# Patient Record
Sex: MALE | Race: WHITE
[De-identification: names, ages, dates, MRNs, and addresses within clinical notes are randomized per-mention and may not be internally consistent; named-entity substitution may affect disease eponyms.]

---

## 2018-12-13 ENCOUNTER — HOSPITAL ENCOUNTER (INPATIENT)
Dept: HOSPITAL 92 - ERS | Age: 83
LOS: 5 days | Discharge: HOME HEALTH | DRG: 689 | End: 2018-12-18
Attending: FAMILY MEDICINE | Admitting: FAMILY MEDICINE
Payer: MEDICARE

## 2018-12-13 VITALS — BODY MASS INDEX: 22.4 KG/M2

## 2018-12-13 DIAGNOSIS — I48.0: ICD-10-CM

## 2018-12-13 DIAGNOSIS — Z79.01: ICD-10-CM

## 2018-12-13 DIAGNOSIS — M25.552: ICD-10-CM

## 2018-12-13 DIAGNOSIS — K21.9: ICD-10-CM

## 2018-12-13 DIAGNOSIS — N40.0: ICD-10-CM

## 2018-12-13 DIAGNOSIS — N18.2: ICD-10-CM

## 2018-12-13 DIAGNOSIS — Z87.891: ICD-10-CM

## 2018-12-13 DIAGNOSIS — D64.9: ICD-10-CM

## 2018-12-13 DIAGNOSIS — I12.9: ICD-10-CM

## 2018-12-13 DIAGNOSIS — M62.82: ICD-10-CM

## 2018-12-13 DIAGNOSIS — E53.8: ICD-10-CM

## 2018-12-13 DIAGNOSIS — Z85.118: ICD-10-CM

## 2018-12-13 DIAGNOSIS — N39.0: Primary | ICD-10-CM

## 2018-12-13 DIAGNOSIS — I24.8: ICD-10-CM

## 2018-12-13 DIAGNOSIS — G92: ICD-10-CM

## 2018-12-13 LAB — CK MB SERPL-MCNC: 9.4 NG/ML (ref 0–6.6)

## 2018-12-13 PROCEDURE — 87086 URINE CULTURE/COLONY COUNT: CPT

## 2018-12-13 PROCEDURE — 36415 COLL VENOUS BLD VENIPUNCTURE: CPT

## 2018-12-13 PROCEDURE — 80048 BASIC METABOLIC PNL TOTAL CA: CPT

## 2018-12-13 PROCEDURE — 85025 COMPLETE CBC W/AUTO DIFF WBC: CPT

## 2018-12-13 PROCEDURE — 82553 CREATINE MB FRACTION: CPT

## 2018-12-13 PROCEDURE — 84443 ASSAY THYROID STIM HORMONE: CPT

## 2018-12-13 PROCEDURE — 82746 ASSAY OF FOLIC ACID SERUM: CPT

## 2018-12-13 PROCEDURE — 82607 VITAMIN B-12: CPT

## 2018-12-13 PROCEDURE — 83735 ASSAY OF MAGNESIUM: CPT

## 2018-12-13 PROCEDURE — 84100 ASSAY OF PHOSPHORUS: CPT

## 2018-12-13 PROCEDURE — 82140 ASSAY OF AMMONIA: CPT

## 2018-12-13 PROCEDURE — 80076 HEPATIC FUNCTION PANEL: CPT

## 2018-12-13 PROCEDURE — 82550 ASSAY OF CK (CPK): CPT

## 2018-12-13 PROCEDURE — 96374 THER/PROPH/DIAG INJ IV PUSH: CPT

## 2018-12-13 PROCEDURE — 71045 X-RAY EXAM CHEST 1 VIEW: CPT

## 2018-12-13 PROCEDURE — 84484 ASSAY OF TROPONIN QUANT: CPT

## 2018-12-13 PROCEDURE — 93005 ELECTROCARDIOGRAM TRACING: CPT

## 2018-12-13 PROCEDURE — 83690 ASSAY OF LIPASE: CPT

## 2018-12-13 RX ADMIN — CEFTRIAXONE SCH MLS: 2 INJECTION, POWDER, FOR SOLUTION INTRAMUSCULAR; INTRAVENOUS at 23:50

## 2018-12-13 NOTE — RAD
CHEST 1 VIEW PORTABLE:

 

Date:  12/13/18 

 

HISTORY:  

89-year-old male with altered mental status. 

 

FINDINGS:

There is fairly prominent rotation to the left. Elevated left hemidiaphragm appears stable. Old granu
lomatous disease. No new confluent pneumonia, overt edema, or significant pleural effusion. 

 

IMPRESSION: 

Prominent elevation of the left hemidiaphragm, stable. Old granulomatous disease with some scattered 
chronic lung changes. Atherosclerosis of aorta with ectasia. No significant acute process. 

 

 

POS: [facility]

## 2018-12-14 LAB
ALBUMIN SERPL BCG-MCNC: 3.4 G/DL (ref 3.4–4.8)
ALP SERPL-CCNC: 75 U/L (ref 40–150)
ALT SERPL W P-5'-P-CCNC: 11 U/L (ref 8–55)
ANION GAP SERPL CALC-SCNC: 12 MMOL/L (ref 10–20)
AST SERPL-CCNC: 38 U/L (ref 5–34)
BASOPHILS # BLD AUTO: 0 THOU/UL (ref 0–0.2)
BASOPHILS NFR BLD AUTO: 0.1 % (ref 0–1)
BILIRUB DIRECT SERPL-MCNC: 0.5 MG/DL (ref 0.1–0.3)
BILIRUB SERPL-MCNC: 0.8 MG/DL (ref 0.2–1.2)
BUN SERPL-MCNC: 23 MG/DL (ref 8.4–25.7)
CALCIUM SERPL-MCNC: 9.3 MG/DL (ref 7.8–10.44)
CHLORIDE SERPL-SCNC: 110 MMOL/L (ref 98–107)
CK SERPL-CCNC: 1376 U/L (ref 30–200)
CO2 SERPL-SCNC: 24 MMOL/L (ref 23–31)
CREAT CL PREDICTED SERPL C-G-VRATE: 51 ML/MIN (ref 70–130)
CREAT CL PREDICTED SERPL C-G-VRATE: 57 ML/MIN (ref 70–130)
EOSINOPHIL # BLD AUTO: 0 THOU/UL (ref 0–0.7)
EOSINOPHIL NFR BLD AUTO: 0.2 % (ref 0–10)
GLUCOSE SERPL-MCNC: 109 MG/DL (ref 83–110)
HGB BLD-MCNC: 12.7 G/DL (ref 14–18)
HGB BLD-MCNC: 12.7 G/DL (ref 14–18)
LIPASE SERPL-CCNC: 8 U/L (ref 8–78)
LYMPHOCYTES # BLD: 1.6 THOU/UL (ref 1.2–3.4)
LYMPHOCYTES NFR BLD AUTO: 19.6 % (ref 21–51)
MAGNESIUM SERPL-MCNC: 2.1 MG/DL (ref 1.6–2.6)
MCH RBC QN AUTO: 32.4 PG (ref 27–31)
MCV RBC AUTO: 97.4 FL (ref 78–98)
MONOCYTES # BLD AUTO: 0.7 THOU/UL (ref 0.11–0.59)
MONOCYTES NFR BLD AUTO: 8.4 % (ref 0–10)
NEUTROPHILS # BLD AUTO: 6 THOU/UL (ref 1.4–6.5)
NEUTROPHILS NFR BLD AUTO: 71.8 % (ref 42–75)
PLATELET # BLD AUTO: 134 THOU/UL (ref 130–400)
PLATELET # BLD AUTO: 138 THOU/UL (ref 130–400)
POTASSIUM SERPL-SCNC: 4.1 MMOL/L (ref 3.5–5.1)
RBC # BLD AUTO: 3.92 MILL/UL (ref 4.7–6.1)
SODIUM SERPL-SCNC: 142 MMOL/L (ref 136–145)
VIT B12 SERPL-MCNC: 284 PG/ML (ref 211–911)
WBC # BLD AUTO: 8.4 THOU/UL (ref 4.8–10.8)

## 2018-12-14 RX ADMIN — DOCUSATE SODIUM 50 MG AND SENNOSIDES 8.6 MG SCH TAB: 8.6; 5 TABLET, FILM COATED ORAL at 19:37

## 2018-12-14 RX ADMIN — Medication SCH: at 19:39

## 2018-12-14 RX ADMIN — Medication SCH: at 08:36

## 2018-12-14 RX ADMIN — CEFTRIAXONE SCH MLS: 2 INJECTION, POWDER, FOR SOLUTION INTRAMUSCULAR; INTRAVENOUS at 23:46

## 2018-12-14 NOTE — HP
PRIMARY CARE PHYSICIAN:  Dr. Jackson Ann.



CHIEF COMPLAINT:  Agitation, altered mental status, and UTI.



HISTORY OF PRESENT ILLNESS:  Mr. Mcrae is a pleasantly confused 89-year-old male,

who has a past medical history of hypertension, paroxysmal atrial fibrillation,

currently on anticoagulation with Eliquis.  He had presented to [facility] today

with his wife due to altered mental status, the patient's wife found him on the

floor earlier this morning and calling out and yelling.  She states that this is not

normal for him.  She then took him to the ER for further evaluation.  Upon arrival

to [facility], his lab work was essentially unremarkable.  However, urinalysis did

show symptoms of UTI at that time.  He was started on an IV ceftriaxone for

antibiotic therapy.  Due to his altered mental status, he was transferred to [facility] for further evaluation.  During transport, the patient

was given IV morphine along with IV Zofran.  He was then given more IV morphine, IV

Ativan and IV Zofran at [facility] ER.  He was

transferred out to the telemetry floor where he was seen and examined by myself.  He

was lying comfortably in bed with sitter at bedside.  There are no family members

present in the room.  He had denied any chest pain, shortness of breath, or

abdominal pain at that time.  He could move all extremities; however, he had a

noticeable small skin tear noticed on his right elbow.  He had denied any pain at

that time.  He had denied abdominal pain, nausea, vomiting, or diarrhea.  Further

labs included TSH, which was unremarkable along with ammonia, which was also

unremarkable.  Blood cultures were ordered.  He had also determined a surrogate

decision maker would be his wife, and he requested to be full code at this time. 



PAST MEDICAL HISTORY:  Hypertension, paroxysmal atrial fibrillation, benign

prostatic hypertrophy, history of lung cancer, treated with chemotherapy and

lobectomy. 



PAST SURGICAL HISTORY:  Left lung lobectomy, chemotherapy, bilateral carpal tunnel,

and lumbar spinal surgery. 



PSYCHIATRIC HISTORY:  Denies any psychiatric history at this time.



SOCIAL HISTORY:  The patient lives at home with his wife.  He denies alcohol,

tobacco, or drug use.  He states he quit smoking in 2010. 



ALLERGIES:  NO KNOWN DRUG ALLERGIES.



HOME MEDICATIONS:  

1. Centrum Silver p.o. daily.

2. Crestor 20 mg p.o. daily.

3. Eliquis 5 mg p.o. daily.

4. Pepcid 40 mg p.o. daily.

5. Folic acid 5 mg p.o. daily.

6. __________.



REVIEW OF SYSTEMS:  CONSTITUTIONAL:  The patient is pleasantly confused, he denies

any pain, he denies any weight loss or weight gain.  He denies fever or chills. 

HEENT:  He denies fall.  He denies hitting his head.  He denies any eye changes

including blurred vision, eye redness or discharge.  He denies sore throat.  He

denies neck pain. 

CARDIOVASCULAR:  He denies chest pain or palpitations, he, however reports history

of paroxysmal atrial fibrillation, currently on anticoagulation with Eliquis. 

PULMONARY:  Denies cough, shortness of breath, hemoptysis or wheezing. 

GASTROINTESTINAL:  Denies abdominal pain.  Did report some nausea earlier today;

however, this improved after Zofran.  Denies any vomiting, diarrhea, or change in

stool. 

GENITOURINARY:  Does report some frequency and urgency.  He also reports some

dysuria.  The patient has a strong odor of urine.  He denies any hematuria. 

SKIN:  He denies any rashes or bleeding.  Does report hurting his elbow at home and

reports some diffuse bruising. 

MUSCULOSKELETAL:  Denies any edema.  Denies any pain.  Does report injuring his

right elbow. 

NEUROLOGICAL:  He does not report any weakness, however, per spouse on admission,

the patient is quite confused. 

PSYCHOLOGIC:  Denies any suicidal or homicidal ideation.



PHYSICAL EXAMINATION:

VITAL SIGNS:  /74, pulse 88, respirations 16, temp 97.5, and O2 saturations

100% on room air. 

GENERAL:  The patient is alert, however, pleasantly confused, oriented to self.  No

acute distress noted. 

HEENT:  Normocephalic, atraumatic.  Hearing intact, however, slightly hard of

hearing.  Moist mucous membranes.  Trachea midline.  No masses noted.  Pupils equal

and reactive to light.  Extraocular muscles intact.  Sclerae anicteric. 

NECK:  Soft, supple.  No JVD.  No bruit. 

CARDIOVASCULAR:  Positive S1 and S2.  Irregularly irregular rhythm.  Rate

controlled.  No cyanosis.  No clubbing.  Radial and pedal pulses intact bilaterally. 

PULMONARY:  Clear to auscultation bilaterally.  No wheezes, no rhonchi, no rales. 

GENITOURINARY:  No CVA tenderness, however, positive suprapubic tenderness to

palpation. 

MUSCULOSKELETAL:  Strength 5+ bilaterally in upper and lower extremities.  Moves all

extremities. 

SKIN:  Warm and dry.  Skin tear noted at right elbow.  Diffuse bruising in upper

extremities noted. 

NEUROLOGICAL:  The patient is pleasantly confused.  Cranial nerves 2 through 12

intact.  No focal deficits.  Moves all extremities. 



LABORATORY DATA:  Laboratory data obtained at [facility], essentially unremarkable.

 CK-MB 9.4 here at [facility], troponin 0.036, TSH __________, ammonia 34. 



DIAGNOSTIC IMAGING:  CT of head at [facility] was unremarkable and showed no acute

intracranial abnormalities. 



Chest x-ray was unremarkable.



ASSESSMENT AND PLAN:  

1. Urinary tract infection.  Check urine culture.  Continue IV ceftriaxone and

monitor the patient clinically. 

2. Altered mental status, likely secondary to infection as above.  Continue

antibiotic therapy with IV ceftriaxone.  Check blood cultures and urine cultures.

Ammonia level unremarkable.  TSH unremarkable.  CTA showed no acute abnormality.

Consult Speech evaluation for further evaluation of swallowing. 

3. History of paroxysmal atrial fibrillation, currently stable and rate controlled.

Continue on the patient's home medications including anticoagulation with Eliquis.

Pending clinical findings.  We will consider consult to Cardiology. 

4. Hypertension, currently stable.  Continue the patient's home medications.

5. Deep venous thrombosis prophylaxis with patient's home medication and

anticoagulation with Eliquis. 

6. Gastrointestinal prophylaxis with Pepcid.

7. Code status:  Full code.

8. Surrogate decision maker is Mr. Mcrae's wife, Yashira. 



Disposition and further medical management pending the patient's progress and

further clinical findings. 







Job ID:  [id number]

## 2018-12-14 NOTE — PRG
DATE OF SERVICE:  12/14/2018



SUBJECTIVE:  Mr. Mcrae is an 89-year-old male with paroxysmal atrial fibrillation,

benign prostatic hypertrophy, presented to the emergency room with altered mentation

at [facility].  CT of brain was negative.  Urinalysis showed 50 to 100 wbc's with 1+

bacteria.  He received 1 g Rocephin and was started on IV fluids.  He also received

a total of 10 mg IV morphine at that facility for pain.  He was transferred to this

emergency room. 



The patient has a sitter at the bedside.  His confusion has somewhat improved.  He

denies any pain at this time.  No fevers, chills, headache, double vision, blurring

of vision, or focal weakness reported. 



OBJECTIVE:  VITAL SIGNS:  Temperature 98.2, pulse of 67, respirations of 16, blood

pressure of 123/50, and O2 saturation of 98% on room air. 

GENERAL:  An 89-year-old male, in no apparent distress. 

LUNGS:  Clear to auscultation bilaterally.  No wheezing, rales, or rhonchi

appreciated. 

HEART:  S1 and S2 present.  Regular rate and rhythm.  No heaves or pulsation.  There

was a 2/6 systolic murmur over the mitral area. 

ABDOMEN:  Soft and nontender.  Bowel sounds present.  No rebound or guarding

appreciated. 

EXTREMITIES:  No edema or calf tenderness. 

NEUROLOGIC:  Grossly nonfocal.  The patient is following commands appropriately. 

PSYCHIATRY:  The patient is alert and awake with intermittent confusion. 

SKIN:  Warm and dry. 

LYMPH NODES:  No palpable lymph nodes in the neck.



REVIEW OF SYSTEMS:  All other review of systems reviewed and were found negative.

Again, the review of system was somewhat limited due to intermittent confusion. 



LABORATORY FINDINGS:  WBC 8.4 with hemoglobin 12.7, platelet 134.  His CK is 1376.

Troponin yesterday was 0.036.  Repeat troponin was negative.  Creatinine 0.98 with

BUN 23, vitamin B12 was 284, folic acid in normal range. 



Telemetry monitoring by my review showed atrial fibrillation.  Chest x-ray by my

review was negative for infiltrate or edema. 



IMPRESSION:  

1. Toxic metabolic encephalopathy, secondary to urinary tract infection.

2. Chronic atrial fibrillation, on anticoagulation.

3. Gastroesophageal reflux disease.

4. Vitamin B12 deficiency.

5. Hypertension.

6. Elevated troponin, secondary to demand ischemia.

7. Rhabdomyolysis, probably secondary to a recent fall according to the spouse.

8. Chronic kidney disease, stage 2.

9. Anemia, probably chronic or due to vitamin B12 deficiency.



PLAN:  The patient will be monitored on the medical floor.  We will repeat labs in

a.m. including CK.  All of his home medications were restarted including Eliquis,

Pepcid, and Toprol-XL.  We will hold statins due to rhabdomyolysis.  We will also

reduce Toprol dose due to blood pressure on the low normal range.  It is unclear

whether he takes Flomax at home.  We will check postvoid residual.  We will continue

IV ceftriaxone.  We will add PT and OT.  We can probably discontinue sitter.  We

will add neuro checks.  We will replace vitamin B12.  We will add Ensure.  We will

also change his diet to regular. 



Plan of care was discussed with the family in detail.  They stated understanding.







Job ID:  [id number]

## 2018-12-15 LAB
ANION GAP SERPL CALC-SCNC: 10 MMOL/L (ref 10–20)
BASOPHILS # BLD AUTO: 0 THOU/UL (ref 0–0.2)
BASOPHILS NFR BLD AUTO: 0.2 % (ref 0–1)
BUN SERPL-MCNC: 18 MG/DL (ref 8.4–25.7)
CALCIUM SERPL-MCNC: 8.9 MG/DL (ref 7.8–10.44)
CHLORIDE SERPL-SCNC: 110 MMOL/L (ref 98–107)
CK SERPL-CCNC: 1402 U/L (ref 30–200)
CO2 SERPL-SCNC: 24 MMOL/L (ref 23–31)
CREAT CL PREDICTED SERPL C-G-VRATE: 64 ML/MIN (ref 70–130)
EOSINOPHIL # BLD AUTO: 0 THOU/UL (ref 0–0.7)
EOSINOPHIL NFR BLD AUTO: 0.4 % (ref 0–10)
GLUCOSE SERPL-MCNC: 118 MG/DL (ref 83–110)
HGB BLD-MCNC: 11.9 G/DL (ref 14–18)
LYMPHOCYTES # BLD: 1 THOU/UL (ref 1.2–3.4)
LYMPHOCYTES NFR BLD AUTO: 13.7 % (ref 21–51)
MCH RBC QN AUTO: 31.9 PG (ref 27–31)
MCV RBC AUTO: 96.1 FL (ref 78–98)
MDIFF COMPLETE?: YES
MONOCYTES # BLD AUTO: 0.8 THOU/UL (ref 0.11–0.59)
MONOCYTES NFR BLD AUTO: 10.7 % (ref 0–10)
NEUTROPHILS # BLD AUTO: 5.7 THOU/UL (ref 1.4–6.5)
NEUTROPHILS NFR BLD AUTO: 75 % (ref 42–75)
PLATELET # BLD AUTO: 116 THOU/UL (ref 130–400)
PLATELET BLD QL SMEAR: (no result)
POTASSIUM SERPL-SCNC: 3.8 MMOL/L (ref 3.5–5.1)
RBC # BLD AUTO: 3.73 MILL/UL (ref 4.7–6.1)
SODIUM SERPL-SCNC: 140 MMOL/L (ref 136–145)
WBC # BLD AUTO: 7.5 THOU/UL (ref 4.8–10.8)

## 2018-12-15 RX ADMIN — CYANOCOBALAMIN TAB 1000 MCG SCH MCG: 1000 TAB at 08:52

## 2018-12-15 RX ADMIN — DOCUSATE SODIUM 50 MG AND SENNOSIDES 8.6 MG SCH TAB: 8.6; 5 TABLET, FILM COATED ORAL at 08:51

## 2018-12-15 RX ADMIN — Medication SCH: at 09:07

## 2018-12-15 RX ADMIN — THERA TABS SCH TAB: TAB at 08:51

## 2018-12-15 RX ADMIN — DOCUSATE SODIUM 50 MG AND SENNOSIDES 8.6 MG SCH TAB: 8.6; 5 TABLET, FILM COATED ORAL at 19:25

## 2018-12-15 RX ADMIN — Medication SCH: at 19:25

## 2018-12-15 NOTE — EKG
Test Reason : 

Blood Pressure : ***/*** mmHG

Vent. Rate : 076 BPM     Atrial Rate : 083 BPM

   P-R Int : 000 ms          QRS Dur : 074 ms

    QT Int : 380 ms       P-R-T Axes : 000 016 -18 degrees

   QTc Int : 427 ms

 

Atrial fibrillation

Confirmed by MANFRED RAHMAN ([id number]),  RANJEET SHANNON ([id number]) on 12/15/2018 1:49:55 PM

 

Referred By:             Confirmed By:MANFRED RAHMAN

## 2018-12-15 NOTE — PDOC.PN
- Subjective


Encounter Start Date: 12/15/18


Encounter Start Time: 08:00





Patient seen and examined for Encephalopathy from UTI. No new complaints. 

Overnight events noted.





- Objective


Resuscitation Status - Order Detail:





12/13/18 21:25


Resuscitation Status Routine 


   Resuscitation Status: FULL: Full Resuscitation








MAR Reviewed: Yes


Vital Signs & Weight: 


 Vital Signs (12 hours)











  Temp Pulse Pulse Resp BP BP Pulse Ox


 


 12/15/18 12:37  97.8 F  73   16   135/79  96


 


 12/15/18 09:48    75   145/78 H  


 


 12/15/18 08:00        75 L


 


 12/15/18 07:48  96.8 F L  75   18   151/77 H  92 L


 


 12/15/18 05:34  98.6 F  68   16   154/88 H  93 L








 Weight











Weight                         156 lb 1.6 oz














I&O: 


 











 12/14/18 12/15/18 12/16/18





 06:59 06:59 06:59


 


Intake Total 550  


 


Output Total 150 88 


 


Balance 400 -88 











Result Diagrams: 


 12/15/18 04:26





 12/15/18 04:26





Phys Exam





- Physical Examination


Constitutional: NAD


Respiratory: no wheezing, no rhonchi


Cardiovascular: RRR, no rub


Gastrointestinal: soft, non-tender, positive bowel sounds


Musculoskeletal: no edema


Neurological: moves all 4 limbs





Dx/Plan





- Plan





IMPRESSION:  


1. Toxic metabolic encephalopathy, secondary to UTI.


2. Chronic atrial fibrillation, on anticoagulation.


3. Gastroesophageal reflux disease.


4. Vitamin B12 deficiency.


5. Hypertension.


6. Elevated troponin, secondary to demand ischemia.


7. Rhabdomyolysis, probably secondary to a recent fall according to the spouse.


8. Chronic kidney disease, stage 2.


9. Anemia, probably chronic or due to vitamin B12 deficiency.





PLAN: 


Will obtain urine culture from [facility]


Cont IV Atbx


Cont IV fluids


DC Flomax - Post void was 124 ml


Change Toprol XL to 50 mg daily (home dose)


Cont current meds as below


AM labs including CK





Microbiology





12/14/18 23:34   Urine Straight Catheter   Urine Culture - Preliminary


                              NO GROWTH AT 12 HOURS














Review of Systems





- Review of Systems


Respiratory: negative: Cough, Dry, Shortness of Breath, Hemoptysis, SOB with 

Excertion, Pleuritic Pain, Sputum, Wheezing


Cardiovascular: negative: chest pain, palpitations, orthopnea, paroxysmal 

nocturnal dyspnea, edema, light headedness, other





- Medications/Allergies


Allergies/Adverse Reactions: 


 Allergies











Allergy/AdvReac Type Severity Reaction Status Date / Time


 


No Known Allergies Allergy   Verified 03/22/17 10:46











Medications: 


 Current Medications





Acetaminophen (Tylenol)  650 mg PO Q4H PRN


   PRN Reason: Headache/Fever/Mild Pain (1-3)


Acetaminophen (Tylenol)  650 mg [place] Q4H PRN


   PRN Reason: Headache/Fever/Mild Pain (1-3)


Acetaminophen (Tylenol)  650 mg PO TID [facility]


   Last Admin: 12/15/18 08:52 Dose:  650 mg


Apixaban (Eliquis)  5 mg PO BID [facility]


   Last Admin: 12/15/18 08:49 Dose:  5 mg


Cyanocobalamin (Vitamin B-12)  1,000 mcg PO DAILY [facility]


   Last Admin: 12/15/18 08:52 Dose:  1,000 mcg


Famotidine (Pepcid)  20 mg PO BID [facility]


   Last Admin: 12/15/18 08:51 Dose:  20 mg


Guaifenesin (Robitussin Sf)  200 mg PO Q4H PRN


   PRN Reason: Cough


Sodium Chloride (Normal Saline 0.9%)  1,000 mls @ 70 mls/hr IV .[id number] [facility]


   Last Admin: 12/14/18 23:49 Dose:  1,000 mls


Ceftriaxone Sodium 2 gm/ (Sodium Chloride)  100 mls @ 200 mls/hr IVPB Q24HR@

2300 [facility]


   Last Admin: 12/14/18 23:46 Dose:  100 mls


Lorazepam (Ativan)  0.5 mg SLOW IVP Q6H PRN


   PRN Reason: Anxiety/Agitation


   Last Admin: 12/15/18 07:10 Dose:  0.5 mg


Metoprolol Succinate (Toprol Xl)  50 mg PO DAILY [facility]


   Last Admin: 12/15/18 08:54 Dose:  50 mg


Mineral Oil/White Petrolatum (Eucerin Cream)  0 gm TOP BIDPRN PRN


   PRN Reason: Dry Skin


Multivitamins (Theragran)  1 tab PO DAILY [facility]


   Last Admin: 12/15/18 08:51 Dose:  1 tab


Ondansetron HCl (Zofran Odt)  4 mg PO Q6H PRN


   PRN Reason: Nausea/Vomiting


Ondansetron HCl (Zofran)  4 mg IVP Q6H PRN


   PRN Reason: Nausea/Vomiting


Polyethylene Glycol (Miralax)  17 gm PO DAILY [facility]


   Last Admin: 12/15/18 08:54 Dose:  17 gm


Senna/Docusate Sodium (Senokot S)  1 tab PO BID [facility]


   Last Admin: 12/15/18 08:51 Dose:  1 tab


Sodium Chloride (Flush - Normal Saline)  10 ml IVF Q12HR [facility]


   Last Admin: 12/15/18 09:07 Dose:  Not Given


Sodium Chloride (Flush - Normal Saline)  10 ml IVF PRN PRN


   PRN Reason: Saline Flush


Tamsulosin HCl (Flomax)  0.4 mg PO HS [facility]


   Last Admin: 12/14/18 19:37 Dose:  0.4 mg

## 2018-12-16 LAB
ANION GAP SERPL CALC-SCNC: 9 MMOL/L (ref 10–20)
BASOPHILS # BLD AUTO: 0 THOU/UL (ref 0–0.2)
BASOPHILS NFR BLD AUTO: 0.2 % (ref 0–1)
BUN SERPL-MCNC: 14 MG/DL (ref 8.4–25.7)
CALCIUM SERPL-MCNC: 8.8 MG/DL (ref 7.8–10.44)
CHLORIDE SERPL-SCNC: 110 MMOL/L (ref 98–107)
CK SERPL-CCNC: 1078 U/L (ref 30–200)
CO2 SERPL-SCNC: 26 MMOL/L (ref 23–31)
CREAT CL PREDICTED SERPL C-G-VRATE: 69 ML/MIN (ref 70–130)
EOSINOPHIL # BLD AUTO: 0.1 THOU/UL (ref 0–0.7)
EOSINOPHIL NFR BLD AUTO: 1.7 % (ref 0–10)
GLUCOSE SERPL-MCNC: 117 MG/DL (ref 83–110)
HGB BLD-MCNC: 12 G/DL (ref 14–18)
LYMPHOCYTES # BLD: 1 THOU/UL (ref 1.2–3.4)
LYMPHOCYTES NFR BLD AUTO: 14.6 % (ref 21–51)
MAGNESIUM SERPL-MCNC: 1.9 MG/DL (ref 1.6–2.6)
MCH RBC QN AUTO: 32.3 PG (ref 27–31)
MCV RBC AUTO: 97 FL (ref 78–98)
MONOCYTES # BLD AUTO: 0.5 THOU/UL (ref 0.11–0.59)
MONOCYTES NFR BLD AUTO: 7.6 % (ref 0–10)
NEUTROPHILS # BLD AUTO: 5.3 THOU/UL (ref 1.4–6.5)
NEUTROPHILS NFR BLD AUTO: 75.8 % (ref 42–75)
PLATELET # BLD AUTO: 130 THOU/UL (ref 130–400)
POTASSIUM SERPL-SCNC: 3.7 MMOL/L (ref 3.5–5.1)
RBC # BLD AUTO: 3.71 MILL/UL (ref 4.7–6.1)
SODIUM SERPL-SCNC: 141 MMOL/L (ref 136–145)
WBC # BLD AUTO: 6.9 THOU/UL (ref 4.8–10.8)

## 2018-12-16 RX ADMIN — THERA TABS SCH TAB: TAB at 08:49

## 2018-12-16 RX ADMIN — Medication SCH: at 20:12

## 2018-12-16 RX ADMIN — DOCUSATE SODIUM 50 MG AND SENNOSIDES 8.6 MG SCH TAB: 8.6; 5 TABLET, FILM COATED ORAL at 08:49

## 2018-12-16 RX ADMIN — CYANOCOBALAMIN TAB 1000 MCG SCH MCG: 1000 TAB at 08:49

## 2018-12-16 RX ADMIN — CEFTRIAXONE SCH MLS: 2 INJECTION, POWDER, FOR SOLUTION INTRAMUSCULAR; INTRAVENOUS at 23:27

## 2018-12-16 RX ADMIN — DOCUSATE SODIUM 50 MG AND SENNOSIDES 8.6 MG SCH TAB: 8.6; 5 TABLET, FILM COATED ORAL at 20:09

## 2018-12-16 RX ADMIN — Medication SCH: at 08:50

## 2018-12-16 RX ADMIN — CEFTRIAXONE SCH MLS: 2 INJECTION, POWDER, FOR SOLUTION INTRAMUSCULAR; INTRAVENOUS at 00:03

## 2018-12-16 NOTE — RAD
RADIOGRAPH LEFT HIP 2 VIEWS:

 

DATE: 12/16/2018.

 

HISTORY: 

An 89-year-old male with traumatic left hip pain after a fall.

 

FINDINGS: 

No fracture is identified. However, the diffuse osteopenia could obscure a nondisplaced or minimally 
displaced fracture.  Femoral head contour is maintained.  There is diffuse moderate hip joint space n
arrowing.  No subcapital osteophytes or acetabular osteophytes.

 

IMPRESSION: 

1.  No displaced fracture identified.

 

2.  Osteopenia.

 

3.  Articular cartilage thinning of the left hip.

 

POS: [facility]

## 2018-12-16 NOTE — PDOC.PN
- Subjective


Encounter Start Date: 12/16/18


Encounter Start Time: 08:30





Patient seen and examined for UTI/Encephalopathy. Mentation improving. Left hip 

pain since last night. No other complaints. No overnight events





- Objective


Resuscitation Status - Order Detail:





12/13/18 21:25


Resuscitation Status Routine 


   Resuscitation Status: FULL: Full Resuscitation








MAR Reviewed: Yes


Vital Signs & Weight: 


 Vital Signs (12 hours)











  Temp Pulse Resp BP Pulse Ox


 


 12/16/18 21:42      94 L


 


 12/16/18 20:00  97.8 F  64  20  137/71  94 L








 Weight











Weight                         156 lb 1.6 oz














I&O: 


 











 12/15/18 12/16/18 12/17/18





 06:59 06:59 06:59


 


Intake Total  1166 1255


 


Output Total 88 75 125


 


Balance -88 1091 1130











Result Diagrams: 


 12/16/18 06:16





 12/16/18 06:16





Phys Exam





- Physical Examination


Constitutional: NAD


Respiratory: no wheezing, no rhonchi


Cardiovascular: RRR, no rub


Gastrointestinal: soft, non-tender, positive bowel sounds


Musculoskeletal: no edema


Neurological: non-focal, moves all 4 limbs


Psychiatric: A&O x 3


Skin: no rash





Dx/Plan





- Plan





IMPRESSION:  


1. Toxic metabolic encephalopathy, secondary to UTI. improving


2. Chronic atrial fibrillation, on anticoagulation.


3. Gastroesophageal reflux disease.


4. Vitamin B12 deficiency.


5. Hypertension.


6. Elevated troponin, secondary to demand ischemia.


7. Rhabdomyolysis, probably secondary to a recent fall - CK improving


8. Chronic kidney disease, stage 2.


9. Anemia, probably chronic or due to vitamin B12 deficiency.


10. Left hip pain prob due to recent fall. 





PLAN: 


Await urine culture report from [facility]


Cont IV Atbx/IV fluids


Cont Toprol XL and other meds as below


AM labs


Left hip XR








Review of Systems





- Review of Systems


Respiratory: negative: Cough, Dry, Shortness of Breath, Hemoptysis, SOB with 

Excertion, Pleuritic Pain, Sputum, Wheezing


Cardiovascular: negative: chest pain, palpitations, orthopnea, paroxysmal 

nocturnal dyspnea, edema, light headedness, other





- Medications/Allergies


Allergies/Adverse Reactions: 


 Allergies











Allergy/AdvReac Type Severity Reaction Status Date / Time


 


No Known Allergies Allergy   Verified 03/22/17 10:46











Medications: 


 Current Medications





Acetaminophen (Tylenol)  650 mg PO Q4H PRN


   PRN Reason: Headache/Fever/Mild Pain (1-3)


   Last Admin: 12/16/18 06:11 Dose:  650 mg


Acetaminophen (Tylenol)  650 mg [place] Q4H PRN


   PRN Reason: Headache/Fever/Mild Pain (1-3)


Acetaminophen (Tylenol)  650 mg PO TID [facility]


   Last Admin: 12/16/18 20:08 Dose:  650 mg


Apixaban (Eliquis)  5 mg PO BID [facility]


   Last Admin: 12/16/18 20:09 Dose:  5 mg


Cyanocobalamin (Vitamin B-12)  1,000 mcg PO DAILY [facility]


   Last Admin: 12/16/18 08:49 Dose:  1,000 mcg


Famotidine (Pepcid)  20 mg PO BID [facility]


   Last Admin: 12/16/18 20:08 Dose:  20 mg


Guaifenesin (Robitussin Sf)  200 mg PO Q4H PRN


   PRN Reason: Cough


Sodium Chloride (Normal Saline 0.9%)  1,000 mls @ 70 mls/hr IV .[id number] [facility]


   Last Admin: 12/16/18 06:12 Dose:  1,000 mls


Ceftriaxone Sodium 2 gm/ (Sodium Chloride)  100 mls @ 200 mls/hr IVPB Q24HR@

2300 [facility]


   Last Admin: 12/16/18 00:03 Dose:  100 mls


Lorazepam (Ativan)  0.5 mg SLOW IVP Q6H PRN


   PRN Reason: Anxiety/Agitation


   Last Admin: 12/15/18 07:10 Dose:  0.5 mg


Metoprolol Succinate (Toprol Xl)  50 mg PO DAILY [facility]


   Last Admin: 12/16/18 08:48 Dose:  50 mg


Mineral Oil/White Petrolatum (Eucerin Cream)  0 gm TOP BIDPRN PRN


   PRN Reason: Dry Skin


Multivitamins (Theragran)  1 tab PO DAILY [facility]


   Last Admin: 12/16/18 08:49 Dose:  1 tab


Ondansetron HCl (Zofran Odt)  4 mg PO Q6H PRN


   PRN Reason: Nausea/Vomiting


Ondansetron HCl (Zofran)  4 mg IVP Q6H PRN


   PRN Reason: Nausea/Vomiting


Polyethylene Glycol (Miralax)  17 gm PO DAILY [facility]


   Last Admin: 12/16/18 08:49 Dose:  17 gm


Senna/Docusate Sodium (Senokot S)  1 tab PO BID [facility]


   Last Admin: 12/16/18 20:09 Dose:  1 tab


Sodium Chloride (Flush - Normal Saline)  10 ml IVF Q12HR [facility]


   Last Admin: 12/16/18 20:12 Dose:  Not Given


Sodium Chloride (Flush - Normal Saline)  10 ml IVF PRN PRN


   PRN Reason: Saline Flush


Tramadol HCl (Ultram)  50 mg PO Q8H PRN


   PRN Reason: Pain


   Last Admin: 12/16/18 17:55 Dose:  50 mg

## 2018-12-17 LAB
ANION GAP SERPL CALC-SCNC: 9 MMOL/L (ref 10–20)
BUN SERPL-MCNC: 10 MG/DL (ref 8.4–25.7)
CALCIUM SERPL-MCNC: 8.6 MG/DL (ref 7.8–10.44)
CHLORIDE SERPL-SCNC: 107 MMOL/L (ref 98–107)
CK SERPL-CCNC: 735 U/L (ref 30–200)
CO2 SERPL-SCNC: 24 MMOL/L (ref 23–31)
CREAT CL PREDICTED SERPL C-G-VRATE: 70 ML/MIN (ref 70–130)
GLUCOSE SERPL-MCNC: 111 MG/DL (ref 83–110)
POTASSIUM SERPL-SCNC: 3.7 MMOL/L (ref 3.5–5.1)
SODIUM SERPL-SCNC: 136 MMOL/L (ref 136–145)

## 2018-12-17 RX ADMIN — DOCUSATE SODIUM 50 MG AND SENNOSIDES 8.6 MG SCH TAB: 8.6; 5 TABLET, FILM COATED ORAL at 20:59

## 2018-12-17 RX ADMIN — CYANOCOBALAMIN TAB 1000 MCG SCH MCG: 1000 TAB at 07:58

## 2018-12-17 RX ADMIN — DOCUSATE SODIUM 50 MG AND SENNOSIDES 8.6 MG SCH TAB: 8.6; 5 TABLET, FILM COATED ORAL at 07:58

## 2018-12-17 RX ADMIN — Medication SCH: at 20:59

## 2018-12-17 RX ADMIN — Medication SCH: at 07:59

## 2018-12-17 RX ADMIN — THERA TABS SCH TAB: TAB at 07:58

## 2018-12-17 NOTE — PDOC.PN
- Subjective


Encounter Start Date: 12/17/18


Encounter Start Time: 10:15





Patient seen and examined for UTI/Encephalopathy. Mentation improving. No new 

complaints. No overnight events





- Objective


Resuscitation Status - Order Detail:





12/13/18 21:25


Resuscitation Status Routine 


   Resuscitation Status: FULL: Full Resuscitation








MAR Reviewed: Yes


Vital Signs & Weight: 


 Vital Signs (12 hours)











  Temp Pulse Resp BP Pulse Ox


 


 12/17/18 20:00  98.3 F  71  20  175/79 H  96








 Weight











Weight                         156 lb 1.6 oz














I&O: 


 











 12/16/18 12/17/18 12/18/18





 06:59 06:59 06:59


 


Intake Total 1166 1255 1360


 


Output Total 75 125 


 


Balance 1091 1130 1360











Result Diagrams: 


 12/16/18 06:16





 12/17/18 06:29





Phys Exam





- Physical Examination


Constitutional: NAD


Respiratory: no wheezing, no rhonchi


Cardiovascular: RRR, no rub


Gastrointestinal: soft, non-tender, positive bowel sounds


Musculoskeletal: no edema


Neurological: moves all 4 limbs





Dx/Plan





- Plan





1. Toxic metabolic encephalopathy, secondary to UTI. improving


2. Chronic atrial fibrillation, on anticoagulation.


3. Gastroesophageal reflux disease.


4. Vitamin B12 deficiency.


5. Hypertension.


6. Elevated troponin, secondary to demand ischemia.


7. Rhabdomyolysis, probably secondary to a recent fall - CK improving


8. Chronic kidney disease, stage 2.


9. Anemia, probably chronic or due to vitamin B12 deficiency.


10. Left hip pain prob due to recent fall. XR negative for fractures





PLAN: 


No urine culture sent at [facility]


Cont IV Atbx/IV fluids


Cont Toprol XL and other meds as below


SNF eval


Stable for dc in AM if no overnight events





Microbiology





12/14/18 23:34   Urine Straight Catheter   Urine Culture - Final


                              Presumptive Mckayla Krusei

















Review of Systems





- Review of Systems


Respiratory: negative: Cough, Dry, Shortness of Breath, Hemoptysis, SOB with 

Excertion, Pleuritic Pain, Sputum, Wheezing


Cardiovascular: negative: chest pain, palpitations, orthopnea, paroxysmal 

nocturnal dyspnea, edema, light headedness, other





- Medications/Allergies


Allergies/Adverse Reactions: 


 Allergies











Allergy/AdvReac Type Severity Reaction Status Date / Time


 


No Known Allergies Allergy   Verified 03/22/17 10:46











Medications: 


 Current Medications





Acetaminophen (Tylenol)  650 mg PO Q4H PRN


   PRN Reason: Headache/Fever/Mild Pain (1-3)


   Last Admin: 12/16/18 06:11 Dose:  650 mg


Acetaminophen (Tylenol)  650 mg ID Q4H PRN


   PRN Reason: Headache/Fever/Mild Pain (1-3)


Acetaminophen (Tylenol)  650 mg PO TID [facility]


   Last Admin: 12/17/18 20:58 Dose:  650 mg


Apixaban (Eliquis)  5 mg PO BID [facility]


   Last Admin: 12/17/18 20:58 Dose:  5 mg


Cyanocobalamin (Vitamin B-12)  1,000 mcg PO DAILY [facility]


   Last Admin: 12/17/18 07:58 Dose:  1,000 mcg


Famotidine (Pepcid)  20 mg PO BID [facility]


   Last Admin: 12/17/18 20:59 Dose:  20 mg


Guaifenesin (Robitussin Sf)  200 mg PO Q4H PRN


   PRN Reason: Cough


Ceftriaxone Sodium 2 gm/ (Sodium Chloride)  100 mls @ 200 mls/hr IVPB Q24HR@

2300 [facility]


   Last Admin: 12/16/18 23:27 Dose:  100 mls


Sodium Chloride (Normal Saline 0.9%)  1,000 mls @ 50 mls/hr IV .Q20H [facility]


   Last Admin: 12/17/18 20:05 Dose:  Not Given


Lorazepam (Ativan)  0.5 mg SLOW IVP Q6H PRN


   PRN Reason: Anxiety/Agitation


   Last Admin: 12/15/18 07:10 Dose:  0.5 mg


Metoprolol Succinate (Toprol Xl)  50 mg PO DAILY [facility]


   Last Admin: 12/17/18 07:58 Dose:  50 mg


Mineral Oil/White Petrolatum (Eucerin Cream)  0 gm TOP BIDPRN PRN


   PRN Reason: Dry Skin


Multivitamins (Theragran)  1 tab PO DAILY [facility]


   Last Admin: 12/17/18 07:58 Dose:  1 tab


Ondansetron HCl (Zofran Odt)  4 mg PO Q6H PRN


   PRN Reason: Nausea/Vomiting


Ondansetron HCl (Zofran)  4 mg IVP Q6H PRN


   PRN Reason: Nausea/Vomiting


Polyethylene Glycol (Miralax)  17 gm PO DAILY [facility]


   Last Admin: 12/17/18 07:59 Dose:  17 gm


Senna/Docusate Sodium (Senokot S)  1 tab PO BID [facility]


   Last Admin: 12/17/18 20:59 Dose:  1 tab


Sodium Chloride (Flush - Normal Saline)  10 ml IVF Q12HR [facility]


   Last Admin: 12/17/18 20:59 Dose:  Not Given


Sodium Chloride (Flush - Normal Saline)  10 ml IVF PRN PRN


   PRN Reason: Saline Flush


Tramadol HCl (Ultram)  50 mg PO Q8H PRN


   PRN Reason: Pain


   Last Admin: 12/16/18 17:55 Dose:  50 mg

## 2018-12-18 VITALS — DIASTOLIC BLOOD PRESSURE: 73 MMHG | TEMPERATURE: 97.5 F | SYSTOLIC BLOOD PRESSURE: 125 MMHG

## 2018-12-18 RX ADMIN — CYANOCOBALAMIN TAB 1000 MCG SCH MCG: 1000 TAB at 07:55

## 2018-12-18 RX ADMIN — Medication SCH: at 07:56

## 2018-12-18 RX ADMIN — DOCUSATE SODIUM 50 MG AND SENNOSIDES 8.6 MG SCH TAB: 8.6; 5 TABLET, FILM COATED ORAL at 07:54

## 2018-12-18 RX ADMIN — CEFTRIAXONE SCH MLS: 2 INJECTION, POWDER, FOR SOLUTION INTRAMUSCULAR; INTRAVENOUS at 00:24

## 2018-12-18 RX ADMIN — THERA TABS SCH TAB: TAB at 07:53

## 2018-12-18 NOTE — DIS
DATE OF ADMISSION:  12/13/2018



DATE OF DISCHARGE:  12/18/2018



DISCHARGE DISPOSITION:  Home.



FOLLOWUP:  

1. Follow up with primary care physician, Dr. Jackson Ann in 1 week.

2. Home Health Care through [facility] has been arranged.



DISCHARGE CONDITION:  The patient was seen and examined on the day of discharge.

Denies any new complaints.  No chest pain, shortness of breath, or palpitations. 



DISCHARGE MEDICATIONS:  

1. Vitamin B12 of 1000 mcg daily.

2. Omnicef 300 mg twice a day for 5 days. 



All other home medications were left unchanged.



BRIEF HOSPITAL COURSE:  The patient is an 89-year-old male with hypertension and

paroxysmal atrial fibrillation, presented to the emergency room with altered

mentation at [facility].  He was found to have urinary tract infection.

 He was started on antibiotics and was transferred to this facility.  Please note

that urine cultures were not sent at [facility].  Please refer to the

history and physical for further details. 



The patient was admitted to the hospital with a diagnosis of altered mentation

secondary to UTI.  He was started on IV ceftriaxone with good improvement in his

symptoms.  The urine culture at this facility after 24 hours of antibiotics was

essentially negative except for 10,000 to 25,000 colonies of Candida.  He received

total of 4 to 5 days of IV antibiotics.  We will change to ceftriaxone.  His

mentation is back to baseline.  Plan of care was discussed with the patient and the

family in detail.  They stated understanding.  He was also found to have vitamin B12

level of 284, for which vitamin B12 has been started.  Intramuscular injections were

avoided due to chronic anticoagulation.  He was also found to have rhabdomyolysis

with CK of 1376 on admission.  His CK yesterday was 735.  He will benefit from

repeat labs after 1 week. 



FINAL DIAGNOSES:  

1. Toxic metabolic encephalopathy secondary to urinary tract infection.  Please

note, urine cultures were not sent at [facility]. 

2. Chronic atrial fibrillation, on anticoagulation.

3. Gastroesophageal reflux disease.

4. Vitamin B12 deficiency.

5. Hypertension.

6. Rhabdomyolysis, probably secondary to recent fall.

7. Left hip pain, probably secondary to recent fall, improving.  X-rays were

negative. 

8. Chronic anemia.

9. Chronic kidney disease, stage 2.

10. Elevated troponin secondary to demand ischemia.



PLAN:  Plan of care was discussed with the patient and the family in detail.  They

stated understanding. 







Job ID:  [id number]